# Patient Record
Sex: FEMALE | Race: WHITE | Employment: FULL TIME | URBAN - METROPOLITAN AREA
[De-identification: names, ages, dates, MRNs, and addresses within clinical notes are randomized per-mention and may not be internally consistent; named-entity substitution may affect disease eponyms.]

---

## 2017-01-09 PROBLEM — K46.9 HERNIA: Status: ACTIVE | Noted: 2017-01-09

## 2017-01-09 PROBLEM — G89.29 CHRONIC PELVIC PAIN IN FEMALE: Status: ACTIVE | Noted: 2017-01-09

## 2017-01-09 PROBLEM — R10.2 CHRONIC PELVIC PAIN IN FEMALE: Status: ACTIVE | Noted: 2017-01-09

## 2017-01-19 PROBLEM — K41.90 FEMORAL HERNIA OF RIGHT SIDE: Status: ACTIVE | Noted: 2017-01-19

## 2017-02-10 PROBLEM — Z09 S/P RIGHT INGUINAL HERNIA REPAIR, FOLLOW-UP EXAM: Status: ACTIVE | Noted: 2017-02-10

## 2017-02-21 DIAGNOSIS — J45.20 MILD INTERMITTENT ASTHMA WITHOUT COMPLICATION: Primary | ICD-10-CM

## 2017-02-21 RX ORDER — BUDESONIDE AND FORMOTEROL FUMARATE DIHYDRATE 160; 4.5 UG/1; UG/1
2 AEROSOL RESPIRATORY (INHALATION) 2 TIMES DAILY
Qty: 1 INHALER | Refills: 3 | Status: SHIPPED | OUTPATIENT
Start: 2017-02-21 | End: 2017-03-03 | Stop reason: HOSPADM

## 2018-09-26 PROBLEM — Z09 S/P RIGHT INGUINAL HERNIA REPAIR, FOLLOW-UP EXAM: Status: RESOLVED | Noted: 2017-02-10 | Resolved: 2018-09-26

## 2019-07-31 ENCOUNTER — HOSPITAL ENCOUNTER (OUTPATIENT)
Dept: GENERAL RADIOLOGY | Age: 39
Discharge: HOME | End: 2019-07-31
Payer: COMMERCIAL

## 2019-07-31 ENCOUNTER — HOSPITAL ENCOUNTER (OUTPATIENT)
Age: 39
Discharge: HOME | End: 2019-07-31
Payer: COMMERCIAL

## 2019-07-31 DIAGNOSIS — R59.1 LYMPHADENOPATHY OF HEAD AND NECK: ICD-10-CM

## 2019-07-31 PROCEDURE — 71046 X-RAY EXAM CHEST 2 VIEWS: CPT

## 2019-11-04 ENCOUNTER — HOSPITAL ENCOUNTER (OUTPATIENT)
Age: 39
Setting detail: SPECIMEN
Discharge: HOME | End: 2019-11-04
Payer: COMMERCIAL

## 2019-11-04 LAB
ALBUMIN SERPL-MCNC: 4.5 GM/DL (ref 3.4–5)
ALP BLD-CCNC: 54 IU/L (ref 40–129)
ALT SERPL-CCNC: 18 U/L (ref 10–40)
ANION GAP SERPL CALCULATED.3IONS-SCNC: 11 MMOL/L (ref 4–16)
AST SERPL-CCNC: 22 IU/L (ref 15–37)
BILIRUB SERPL-MCNC: 0.3 MG/DL (ref 0–1)
BUN BLDV-MCNC: 10 MG/DL (ref 6–23)
CALCIUM SERPL-MCNC: 9.4 MG/DL (ref 8.3–10.6)
CHLORIDE BLD-SCNC: 103 MMOL/L (ref 99–110)
CO2: 26 MMOL/L (ref 21–32)
CREAT SERPL-MCNC: 0.8 MG/DL (ref 0.6–1.1)
ERYTHROCYTE SEDIMENTATION RATE: 18 MM/HR (ref 0–20)
GFR AFRICAN AMERICAN: >60 ML/MIN/1.73M2
GFR NON-AFRICAN AMERICAN: >60 ML/MIN/1.73M2
GLUCOSE BLD-MCNC: 72 MG/DL (ref 70–99)
LACTATE DEHYDROGENASE: 214 IU/L (ref 120–246)
POTASSIUM SERPL-SCNC: 4.5 MMOL/L (ref 3.5–5.1)
SODIUM BLD-SCNC: 140 MMOL/L (ref 135–145)
TOTAL PROTEIN: 7 GM/DL (ref 6.4–8.2)

## 2019-11-04 PROCEDURE — 80053 COMPREHEN METABOLIC PANEL: CPT

## 2019-11-04 PROCEDURE — 85652 RBC SED RATE AUTOMATED: CPT

## 2019-11-04 PROCEDURE — 83615 LACTATE (LD) (LDH) ENZYME: CPT

## 2019-11-15 ENCOUNTER — HOSPITAL ENCOUNTER (OUTPATIENT)
Dept: CT IMAGING | Age: 39
Discharge: HOME | End: 2019-11-15
Payer: COMMERCIAL

## 2019-11-15 DIAGNOSIS — R59.0 CERVICAL LYMPHADENOPATHY: ICD-10-CM

## 2019-11-15 PROCEDURE — 71260 CT THORAX DX C+: CPT

## 2019-11-15 PROCEDURE — 70491 CT SOFT TISSUE NECK W/DYE: CPT

## 2019-11-15 PROCEDURE — 6360000004 HC RX CONTRAST MEDICATION: Performed by: INTERNAL MEDICINE

## 2019-11-15 PROCEDURE — 2580000003 HC RX 258: Performed by: INTERNAL MEDICINE

## 2019-11-15 RX ORDER — SODIUM CHLORIDE 0.9 % (FLUSH) 0.9 %
10 SYRINGE (ML) INJECTION ONCE
Status: COMPLETED | OUTPATIENT
Start: 2019-11-15 | End: 2019-11-15

## 2019-11-15 RX ADMIN — Medication 10 ML: at 08:54

## 2019-11-15 RX ADMIN — IOPAMIDOL 75 ML: 755 INJECTION, SOLUTION INTRAVENOUS at 08:54

## 2019-12-18 ENCOUNTER — HOSPITAL ENCOUNTER (OUTPATIENT)
Dept: OCCUPATIONAL THERAPY | Age: 39
Setting detail: THERAPIES SERIES
Discharge: HOME | End: 2019-12-18
Payer: COMMERCIAL

## 2019-12-18 ENCOUNTER — HOSPITAL ENCOUNTER (OUTPATIENT)
Dept: PHYSICAL THERAPY | Age: 39
Setting detail: THERAPIES SERIES
Discharge: HOME | End: 2019-12-18
Payer: COMMERCIAL

## 2019-12-18 PROCEDURE — 97035 APP MDLTY 1+ULTRASOUND EA 15: CPT

## 2019-12-18 PROCEDURE — G0283 ELEC STIM OTHER THAN WOUND: HCPCS

## 2019-12-18 PROCEDURE — 95832 HC OT HAND EVALUATION: CPT

## 2019-12-18 PROCEDURE — 97110 THERAPEUTIC EXERCISES: CPT

## 2019-12-18 PROCEDURE — 97162 PT EVAL MOD COMPLEX 30 MIN: CPT

## 2019-12-18 PROCEDURE — 97161 PT EVAL LOW COMPLEX 20 MIN: CPT

## 2019-12-18 ASSESSMENT — PAIN DESCRIPTION - PROGRESSION: CLINICAL_PROGRESSION: NOT CHANGED

## 2019-12-18 ASSESSMENT — PAIN DESCRIPTION - FREQUENCY: FREQUENCY: CONTINUOUS

## 2019-12-18 ASSESSMENT — PAIN - FUNCTIONAL ASSESSMENT: PAIN_FUNCTIONAL_ASSESSMENT: ACTIVITIES ARE NOT PREVENTED

## 2019-12-18 ASSESSMENT — PAIN DESCRIPTION - ORIENTATION: ORIENTATION: RIGHT;LEFT

## 2019-12-19 ASSESSMENT — PAIN DESCRIPTION - PROGRESSION: CLINICAL_PROGRESSION: NOT CHANGED

## 2019-12-19 ASSESSMENT — PAIN DESCRIPTION - LOCATION: LOCATION: NECK

## 2019-12-19 ASSESSMENT — PAIN SCALES - GENERAL: PAINLEVEL_OUTOF10: 5

## 2019-12-19 ASSESSMENT — PAIN DESCRIPTION - FREQUENCY: FREQUENCY: CONTINUOUS

## 2019-12-19 ASSESSMENT — PAIN - FUNCTIONAL ASSESSMENT: PAIN_FUNCTIONAL_ASSESSMENT: ACTIVITIES ARE NOT PREVENTED

## 2019-12-19 ASSESSMENT — PAIN DESCRIPTION - ORIENTATION: ORIENTATION: RIGHT;LEFT

## 2019-12-26 ENCOUNTER — HOSPITAL ENCOUNTER (OUTPATIENT)
Dept: PHYSICAL THERAPY | Age: 39
Setting detail: THERAPIES SERIES
Discharge: HOME | End: 2019-12-26
Payer: COMMERCIAL

## 2019-12-26 ENCOUNTER — HOSPITAL ENCOUNTER (OUTPATIENT)
Dept: OCCUPATIONAL THERAPY | Age: 39
Setting detail: THERAPIES SERIES
Discharge: HOME | End: 2019-12-26
Payer: COMMERCIAL

## 2019-12-26 PROCEDURE — 97110 THERAPEUTIC EXERCISES: CPT

## 2019-12-26 PROCEDURE — 97035 APP MDLTY 1+ULTRASOUND EA 15: CPT

## 2019-12-26 PROCEDURE — 97140 MANUAL THERAPY 1/> REGIONS: CPT

## 2019-12-26 PROCEDURE — G0283 ELEC STIM OTHER THAN WOUND: HCPCS

## 2019-12-30 ENCOUNTER — HOSPITAL ENCOUNTER (OUTPATIENT)
Dept: OCCUPATIONAL THERAPY | Age: 39
Setting detail: THERAPIES SERIES
Discharge: HOME | End: 2019-12-30
Payer: COMMERCIAL

## 2019-12-30 ENCOUNTER — HOSPITAL ENCOUNTER (OUTPATIENT)
Dept: PHYSICAL THERAPY | Age: 39
Setting detail: THERAPIES SERIES
Discharge: HOME | End: 2019-12-30
Payer: COMMERCIAL

## 2019-12-30 PROCEDURE — G0283 ELEC STIM OTHER THAN WOUND: HCPCS

## 2019-12-30 PROCEDURE — 97110 THERAPEUTIC EXERCISES: CPT

## 2019-12-30 PROCEDURE — 97530 THERAPEUTIC ACTIVITIES: CPT

## 2019-12-30 PROCEDURE — 97035 APP MDLTY 1+ULTRASOUND EA 15: CPT

## 2020-01-03 ENCOUNTER — HOSPITAL ENCOUNTER (OUTPATIENT)
Dept: OCCUPATIONAL THERAPY | Age: 40
Setting detail: THERAPIES SERIES
Discharge: HOME | End: 2020-01-03
Payer: COMMERCIAL

## 2020-01-03 ENCOUNTER — HOSPITAL ENCOUNTER (OUTPATIENT)
Dept: PHYSICAL THERAPY | Age: 40
Setting detail: THERAPIES SERIES
Discharge: HOME | End: 2020-01-03
Payer: COMMERCIAL

## 2020-01-03 PROCEDURE — 97140 MANUAL THERAPY 1/> REGIONS: CPT

## 2020-01-03 PROCEDURE — 97035 APP MDLTY 1+ULTRASOUND EA 15: CPT

## 2020-01-03 PROCEDURE — 97110 THERAPEUTIC EXERCISES: CPT

## 2020-01-03 PROCEDURE — 97530 THERAPEUTIC ACTIVITIES: CPT

## 2020-01-03 PROCEDURE — G0283 ELEC STIM OTHER THAN WOUND: HCPCS

## 2020-01-03 NOTE — FLOWSHEET NOTE
[] Ultrasound  [x] Estim  [] Gait Training      [] Cervical Traction [] Lumbar Traction  [x] Neuromuscular Re-education    [] Cold/hotpack [] Iontophoresis   [x] Instruction in HEP      [] Vasopneumatic   [x] Dry Needling    [x] Manual Therapy               [] Aquatic Therapy              Electronically signed by:  Nick Medina 1/3/2020, 9:42 AM

## 2020-01-06 ENCOUNTER — HOSPITAL ENCOUNTER (OUTPATIENT)
Dept: PHYSICAL THERAPY | Age: 40
Setting detail: THERAPIES SERIES
Discharge: HOME | End: 2020-01-06
Payer: COMMERCIAL

## 2020-01-06 ENCOUNTER — HOSPITAL ENCOUNTER (OUTPATIENT)
Dept: OCCUPATIONAL THERAPY | Age: 40
Setting detail: THERAPIES SERIES
Discharge: HOME | End: 2020-01-06
Payer: COMMERCIAL

## 2020-01-06 PROCEDURE — G0283 ELEC STIM OTHER THAN WOUND: HCPCS

## 2020-01-06 PROCEDURE — 97110 THERAPEUTIC EXERCISES: CPT

## 2020-01-06 PROCEDURE — 97035 APP MDLTY 1+ULTRASOUND EA 15: CPT

## 2020-01-06 PROCEDURE — 97140 MANUAL THERAPY 1/> REGIONS: CPT

## 2020-01-06 NOTE — FLOWSHEET NOTE
Outpatient Physical Therapy  [place]           [x] Phone: [phone]   Fax: [phone]  [place]           [] Phone: [phone]   Fax: [phone]        Physical Therapy Daily Treatment Note  Date:  2020    Patient Name:  Blanca Aceves    :  1980  MRN: [id number]  Restrictions/Precautions:  Other position/activity restrictions: none  Diagnosis:   Diagnosis: neck pain/ HA  Date of Injury/Surgery:   Treatment Diagnosis: Treatment Diagnosis: neck pain    Insurance/Certification information: PT [facility]   Referring Physician:  Referring Practitioner: Alexander Marsh Doctor Visit:    Plan of care signed (Y/N):    Outcome Measure: NDI     Visit# / total visits:  5 /10  Pain level: 3/10   Goals:          Long term goals  Time Frame for Long term goals : 8 weeks   Long term goal 1: patients goal- less pain  Long term goal 2: compliant with HEP  Long term goal 3: 10/50 NDI    Summary of Evaluation:   ASSESSMENT  Patient primary complaints: neck pain/ headaches  History of condition:onset of neck pain 2019- no change with time-not sure what increases pain; takes OTC ibuprofen- chiropractic once monthly- massage 1-2 x per week  Current functional limitations: pain does not prevent any activities however pain impacts on all ADL/IADL  Patient demonstrates: limited neck ROM due to pain- difficulty with upper back/shoudler muscle contraction due topain  PLOF:no pain in neck prior to 6 months ago  Skilled PT interventions are intended to address pain and muscle contraction issues  which should  enable patient  to return to PLOF  Patient agrees with established plan of care and assisted in the development of their short term and long term goals  Barriers to learning:none- no mental/cognitive barriers observed  preferred learning style(s):   written- demonstration -practice  Preferred Language: English  Potential barriers to progress:chronic pain  The patient appears

## 2020-01-08 ENCOUNTER — HOSPITAL ENCOUNTER (OUTPATIENT)
Dept: PHYSICAL THERAPY | Age: 40
Setting detail: THERAPIES SERIES
Discharge: HOME | End: 2020-01-08
Payer: COMMERCIAL

## 2020-01-08 ENCOUNTER — HOSPITAL ENCOUNTER (OUTPATIENT)
Dept: OCCUPATIONAL THERAPY | Age: 40
Setting detail: THERAPIES SERIES
Discharge: HOME | End: 2020-01-08
Payer: COMMERCIAL

## 2020-01-08 PROCEDURE — 97035 APP MDLTY 1+ULTRASOUND EA 15: CPT

## 2020-01-08 PROCEDURE — G0283 ELEC STIM OTHER THAN WOUND: HCPCS

## 2020-01-08 PROCEDURE — 97110 THERAPEUTIC EXERCISES: CPT

## 2020-01-08 PROCEDURE — 97140 MANUAL THERAPY 1/> REGIONS: CPT

## 2020-01-08 NOTE — FLOWSHEET NOTE
Occupational Therapy Out Patient Daily Treatment Note     [x][place] [address]      FAIZAN [facility]     [address] [facility] [address] [facility] 23       [facility] 218, [address], [address]       [address]ek[address] Arreg[address]     [phone]  [phone] [phone]  ______________________________________________________________________  Date:  2020  Patient Name:   Krystian    :  1980  Restrictions/Precautions:  General  Diagnosis:    : L wrist painTreatment Diagnosis:  wrist pain  Insurance/Certification information:  [facility]  Referring Physician:   Sonya Dumont  Plan of care signed (Y/N):    Visit# / total visits:   Pain level: 10/10 Now    10/10 at worst at work    Subjective: States\"I'm having a bad day, my wrist and neck are both hurting\" Pt was tearful during treatement session. Prior Level of Function:  Full functional use until past few months  Patient Goals: Decrease pain with use    Treatment Flowsheet   Right Left     US wrist ulnar  x     massage  x     stretches  x   AROM  x   PROM  x     Issued and instructed in HEP  x     digiflex 1.5#     Pinch pin 1#     Wrist flex and ext 1#       HP       Ice pack  x                                                             Interventions/Modalities used:  [x] Therapeutic Exercise   [x] Modalities:  [x] Therapeutic Activity    [x] Ultrasound [] Elec Stimulation   [] Total Motion Release    [] Fluido [] Kinesiotaping  [] Neuromuscular Re-education   [] Ionto [] Coldpack/hotpack   [x] Instruction in HEP    Other:    Objective Findings:  AROM WNL  .     Communication with other providers: POC to physician    Education provided to patient: Issued and instructed in HEP    Adverse Reactions to treatment: none    Time in:  145  Time out:  225  Timed treatment minutes:  35  Total treatment time:  40    Treatment/Activity Tolerance:     [x]  Patient tolerated treatment well []

## 2020-01-08 NOTE — FLOWSHEET NOTE
Outpatient Physical Therapy  [place]           [x] Phone: [phone]   Fax: [phone]  [place]           [] Phone: [phone]   Fax: [phone]        Physical Therapy Daily Treatment Note  Date:  2020    Patient Name:  Blanca Estrada    :  1980  MRN: [id number]  Restrictions/Precautions:  Other position/activity restrictions: none  Diagnosis:   Diagnosis: neck pain/ HA  Date of Injury/Surgery:   Treatment Diagnosis: Treatment Diagnosis: neck pain    Insurance/Certification information: PT [facility]: [facility]   Referring Physician:  Referring Practitioner: Shade Marsh Doctor Visit:    Plan of care signed (Y/N):    Outcome Measure: NDI     Visit# / total visits:  6 /10  Pain level: 3/10   Goals:          Long term goals  Time Frame for Long term goals : 8 weeks   Long term goal 1: patients goal- less pain  Long term goal 2: compliant with HEP  Long term goal 3: 10/50 NDI    Summary of Evaluation:   ASSESSMENT  Patient primary complaints: neck pain/ headaches  History of condition:onset of neck pain 2019- no change with time-not sure what increases pain; takes OTC ibuprofen- chiropractic once monthly- massage 1-2 x per week  Current functional limitations: pain does not prevent any activities however pain impacts on all ADL/IADL  Patient demonstrates: limited neck ROM due to pain- difficulty with upper back/shoudler muscle contraction due topain  PLOF:no pain in neck prior to 6 months ago  Skilled PT interventions are intended to address pain and muscle contraction issues  which should  enable patient  to return to PLOF  Patient agrees with established plan of care and assisted in the development of their short term and long term goals  Barriers to learning:none- no mental/cognitive barriers observed  preferred learning style(s):   written- demonstration -practice  Preferred Language: English  Potential barriers to progress:chronic pain  The patient appears motivated to participate in PT and regain PLOF: yes      Subjective: pt reports difficulty sleeping persists, could see no difference with E-stim so far,  Pt reports after 8 hour shift pt has severe pain in neck. Rad symptoms persist        Any changes in Ambulatory Summary Sheet? None        Objective:  See eval           Exercises: (No more than 4 columns)   Exercise/Equipment Date 12/18/19 Date 12/26/19 #2 Date 12/30/19 #3 Date 1/3/19 #4 Date 1/6/20 #5 Date 1/8/20 #6              WARM UP            UBE 2 ; FWD/ 1 min BWD 2 ; FWD/ 1 min BWD 3 min fwd/3 min bwd 3 min fwd/3 min bwd 3 min fwd/3 min bwd 4 min fwd/4 min bwd   pulleys  10 reps x10 reps x10 reps x15 reps x15 reps x15 reps   TABLE         Standing scap retraction YTB x10 reps YTB x10 reps YTB x15 reps No time YTB x15 reps YTB x15 reps   scap retraction                                        STANDING                                                                             PROPRIOCEPTION                                          Manual traction 5 min            MODALITIES See below IFC Leo UT 15 min IFC Leo UT 15 min IFC Leo UT 15 min with moist heat IFC Leo UT 15 min 1-150hz (sub acute) with moist heat IFC Leo UT 15 min 1-150hz (sub acute) with moist heat                         Other Therapeutic Activities/Education:        Home Exercise Program:        Manual Treatments:        Modalities:  Patient received 15 minutes of inferential current electrical stimulation to the  R/L upper trap area to decrease pain and muscle tension. Patient received this treatment in the seated position. The intensity of the current was raises to the patients comfort for pain relief. Patient demonstrated negative skin reaction after treatment.        Communication with other providers:    Assessment:  3-4/10 after treatment, pt reported icreased soreness after E-stim    Plan for Next Session:  above interventions      Time In / Time Out: 100-115       Timed Code/Total Treatment Minutes: 67/28 TE15, ES15, man15  Next Progress Note due:  10 sessions      Plan of Care Interventions:  [x] Therapeutic Exercise  [] Modalities:  [x] Therapeutic Activity     [] Ultrasound  [x] Estim  [] Gait Training      [] Cervical Traction [] Lumbar Traction  [x] Neuromuscular Re-education    [] Cold/hotpack [] Iontophoresis   [x] Instruction in HEP      [] Vasopneumatic   [x] Dry Needling    [x] Manual Therapy               [] Aquatic Therapy              Electronically signed by:  Riley Velásquez 1/8/2020, 8:14 AM

## 2020-01-13 ENCOUNTER — HOSPITAL ENCOUNTER (OUTPATIENT)
Dept: OCCUPATIONAL THERAPY | Age: 40
Setting detail: THERAPIES SERIES
Discharge: HOME | End: 2020-01-13
Payer: COMMERCIAL

## 2020-01-13 ENCOUNTER — HOSPITAL ENCOUNTER (OUTPATIENT)
Dept: PHYSICAL THERAPY | Age: 40
Setting detail: THERAPIES SERIES
Discharge: HOME | End: 2020-01-13
Payer: COMMERCIAL

## 2020-01-13 PROCEDURE — 97140 MANUAL THERAPY 1/> REGIONS: CPT

## 2020-01-13 PROCEDURE — G0283 ELEC STIM OTHER THAN WOUND: HCPCS

## 2020-01-13 PROCEDURE — 97110 THERAPEUTIC EXERCISES: CPT

## 2020-01-13 PROCEDURE — 97035 APP MDLTY 1+ULTRASOUND EA 15: CPT

## 2020-01-13 NOTE — FLOWSHEET NOTE
Outpatient Physical Therapy  [place]           [x] Phone: [phone]   Fax: [phone]  [place]           [] Phone: [phone]   Fax: [phone]        Physical Therapy Daily Treatment Note  Date:  2020    Patient Name:  Blanca Grant    :  1980  MRN: [id number]  Restrictions/Precautions:  Other position/activity restrictions: none  Diagnosis:   Diagnosis: neck pain/ HA  Date of Injury/Surgery:   Treatment Diagnosis: Treatment Diagnosis: neck pain    Insurance/Certification information: PT Insurance Information: [facility]   Referring Physician:  Referring Practitioner: Neli Marsh Doctor Visit:    Plan of care signed (Y/N):    Outcome Measure: NDI     Visit# / total visits:  7 /10  Pain level: 3/10   Goals:          Long term goals  Time Frame for Long term goals : 8 weeks   Long term goal 1: patients goal- less pain  Long term goal 2: compliant with HEP  Long term goal 3: 10/50 NDI    Summary of Evaluation:   ASSESSMENT  Patient primary complaints: neck pain/ headaches  History of condition:onset of neck pain 2019- no change with time-not sure what increases pain; takes OTC ibuprofen- chiropractic once monthly- massage 1-2 x per week  Current functional limitations: pain does not prevent any activities however pain impacts on all ADL/IADL  Patient demonstrates: limited neck ROM due to pain- difficulty with upper back/shoudler muscle contraction due topain  PLOF:no pain in neck prior to 6 months ago  Skilled PT interventions are intended to address pain and muscle contraction issues  which should  enable patient  to return to PLOF  Patient agrees with established plan of care and assisted in the development of their short term and long term goals  Barriers to learning:none- no mental/cognitive barriers observed  preferred learning style(s):   written- demonstration -practice  Preferred Language: English  Potential barriers to progress:chronic pain  The patient appears motivated to participate in PT and regain PLOF: yes      Subjective: pt reports slightly decreased neck pain today, \"I still feel pressure on L side\" lifting mostly with RUE at work and only light wts        Any changes in Ambulatory Summary Sheet? None        Objective:  See eval           Exercises: (No more than 4 columns)   Exercise/Equipment Date 1/3/19 #4 Date 1/6/20 #5 Date 1/8/20 #6 Date 1/13/20 #7            WARM UP          UBE 3 min fwd/3 min bwd 3 min fwd/3 min bwd 4 min fwd/4 min bwd 4 min fwd/4 min bwd   pulleys  x15 reps x15 reps x15 reps x20   TABLE       Standing scap retraction No time YTB x15 reps YTB x15 reps YTB x20   scap retraction                                STANDING                                                             PROPRIOCEPTION                               Manual traction 5 min Manual traction 5 min          MODALITIES IFC Leo UT 15 min with moist heat IFC Leo UT 15 min 1-150hz (sub acute) with moist heat IFC Leo UT 15 min 1-150hz (sub acute) with moist heat IFC Leo UT 15 min 1-150hz in supine with moist heat                     Other Therapeutic Activities/Education:        Home Exercise Program:        Manual Treatments:        Modalities:  Patient received 15 minutes of inferential current electrical stimulation to the  R/L upper trap area to decrease pain and muscle tension. Patient received this treatment in the seated position. The intensity of the current was raises to the patients comfort for pain relief. Patient demonstrated negative skin reaction after treatment.        Communication with other providers:    Assessment:  Pt reports no change in pain level after E-stim laying down, thus far no interventions have significantly impacted pt's pain level    Plan for Next Session:  above interventions      Time In / Time Out: 145-230       Timed Code/Total Treatment Minutes: 30/45 TE15, ES15, man15  Next Progress Note due:  10 sessions      Plan of Care Interventions:  [x] Therapeutic Exercise  [] Modalities:  [x] Therapeutic Activity     [] Ultrasound  [x] Estim  [] Gait Training      [] Cervical Traction [] Lumbar Traction  [x] Neuromuscular Re-education    [] Cold/hotpack [] Iontophoresis   [x] Instruction in HEP      [] Vasopneumatic   [x] Dry Needling    [x] Manual Therapy               [] Aquatic Therapy              Electronically signed by:  Riley Velásquez 1/13/2020, 10:00 AM

## 2020-01-15 ENCOUNTER — HOSPITAL ENCOUNTER (OUTPATIENT)
Dept: PHYSICAL THERAPY | Age: 40
Setting detail: THERAPIES SERIES
Discharge: HOME | End: 2020-01-15
Payer: COMMERCIAL

## 2020-01-15 ENCOUNTER — HOSPITAL ENCOUNTER (OUTPATIENT)
Dept: OCCUPATIONAL THERAPY | Age: 40
Setting detail: THERAPIES SERIES
Discharge: HOME | End: 2020-01-15
Payer: COMMERCIAL

## 2020-01-15 PROCEDURE — 97140 MANUAL THERAPY 1/> REGIONS: CPT

## 2020-01-15 PROCEDURE — 97110 THERAPEUTIC EXERCISES: CPT

## 2020-01-15 PROCEDURE — 97530 THERAPEUTIC ACTIVITIES: CPT

## 2020-01-15 PROCEDURE — 97035 APP MDLTY 1+ULTRASOUND EA 15: CPT

## 2020-01-15 PROCEDURE — 97112 NEUROMUSCULAR REEDUCATION: CPT

## 2020-01-15 PROCEDURE — G0283 ELEC STIM OTHER THAN WOUND: HCPCS

## 2020-01-15 NOTE — FLOWSHEET NOTE
Occupational Therapy Out Patient Daily Treatment Note     [x][place] [address]      FAIZAN [facility]     [address] [facility] [address] [facility] 23       [facility], [address], [address]       [address] [address]     [phone]  [phone] [phone]  ______________________________________________________________________  Date:  1/15/2020  Patient Name:   Krystian    :  1980  Restrictions/Precautions:  General  Diagnosis:    : L wrist painTreatment Diagnosis:  wrist pain  Insurance/Certification information:  [employer]  Referring Physician:   Cheryl Zaragoza  Plan of care signed (Y/N):    Visit# / total visits:   Pain level: 3/10 before and after tx    Subjective: States \"my wrist is feeling better\". Prior Level of Function:  Full functional use until past few months  Patient Goals: Decrease pain with use    Treatment Flowsheet   Right Left     US wrist ulnar  x     massage  x     stretches  x   AROM with fluidotherapy  x   PROM  x     Issued and instructed in HEP  reviewed     digiflex 1.5#  x   Pinch pin 1#  x   Wrist flex and ext 1#  x     HP       Ice pack  x     Sup\pron with bar within tolerance  x                                                      Interventions/Modalities used:  [x] Therapeutic Exercise   [x] Modalities:  [x] Therapeutic Activity    [x] Ultrasound [] Elec Stimulation   [] Total Motion Release    [] Fluido [] Kinesiotaping  [] Neuromuscular Re-education   [] Ionto [] Coldpack/hotpack   [x] Instruction in HEP    Other:    Objective Findings:  AROM WNL  .   11.9#    Communication with other providers: POC to physician    Education provided to patient: Issued and instructed in HEP    Adverse Reactions to treatment: none    Time in: 1300  Time out:  1345  Timed treatment minutes:  35  Total treatment time:  45    Treatment/Activity Tolerance:     [x]  Patient tolerated treatment well []

## 2020-01-21 NOTE — FLOWSHEET NOTE
cxd 20 for  and 20 because she can't get off work
motivated to participate in PT and regain PLOF: yes      Subjective: pt reports no change in pain level but says painful area is smaller now. Sleeping however; unimproved        Any changes in Ambulatory Summary Sheet? None        Objective:  See eval           Exercises: (No more than 4 columns)   Exercise/Equipment Date 1/3/19 #4 Date 1/6/20 #5 Date 1/8/20 #6 Date 1/13/20 #7 Date 1/15/20 #8             WARM UP           UBE 3 min fwd/3 min bwd 3 min fwd/3 min bwd 4 min fwd/4 min bwd 4 min fwd/4 min bwd 4 min fwd/4 min bwd   pulleys  x15 reps x15 reps x15 reps x20 x20   TABLE        Standing scap retraction No time YTB x15 reps YTB x15 reps YTB x20 x20 YTB   scap retraction low rows     10 reps                              STANDING                                                                     PROPRIOCEPTION                                   Manual traction 5 min Manual traction 5 min            MODALITIES IFC Leo UT 15 min with moist heat IFC Leo UT 15 min 1-150hz (sub acute) with moist heat IFC Leo UT 15 min 1-150hz (sub acute) with moist heat IFC Leo UT 15 min 1-150hz in supine with moist heat IFC Leo UT 15 min 1-150hz in supine with moist heat green 1/2 roll under knees                       Other Therapeutic Activities/Education:        Home Exercise Program:        Manual Treatments:        Modalities:  Patient received 15 minutes of inferential current electrical stimulation to the  R/L upper trap area to decrease pain and muscle tension. Patient received this treatment in the seated position. The intensity of the current was raises to the patients comfort for pain relief. Patient demonstrated negative skin reaction after treatment.        Communication with other providers:    Assessment:  3/10 after treatment    Plan for Next Session:  above interventions      Time In / Time Out: 145-225       Timed Code/Total Treatment Minutes: 25/40  TE10, NE15, ES15  Next Progress Note due:  10 sessions      Plan

## 2020-01-29 ENCOUNTER — HOSPITAL ENCOUNTER (OUTPATIENT)
Dept: PHYSICAL THERAPY | Age: 40
Setting detail: THERAPIES SERIES
Discharge: HOME | End: 2020-01-29
Payer: COMMERCIAL

## 2020-01-29 ENCOUNTER — HOSPITAL ENCOUNTER (OUTPATIENT)
Dept: OCCUPATIONAL THERAPY | Age: 40
Setting detail: THERAPIES SERIES
Discharge: HOME | End: 2020-01-29
Payer: COMMERCIAL

## 2020-01-29 PROCEDURE — 97140 MANUAL THERAPY 1/> REGIONS: CPT

## 2020-01-29 PROCEDURE — G0283 ELEC STIM OTHER THAN WOUND: HCPCS

## 2020-01-29 PROCEDURE — 97110 THERAPEUTIC EXERCISES: CPT

## 2020-01-29 PROCEDURE — 97035 APP MDLTY 1+ULTRASOUND EA 15: CPT

## 2020-01-29 NOTE — FLOWSHEET NOTE
Occupational Therapy Out Patient Daily Treatment Note     [x][place] [address]      FAIZAN [facility]     [address] [facility] [address] [place] [address]       [facility] [address], [address], 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 Prior Level of Function:  Full functional use until past few months  Patient Goals: Decrease pain with use    Treatment Flowsheet   Right Left     US wrist ulnar  x     massage  x     stretches  x   AROM with fluidotherapy  x   PROM  x     Issued and instructed in HEP  reviewed     digiflex 3#  x   Pinch pin 2#  x   Wrist flex and ext 1# and RD/UD  x            Ice pack       Sup\pron with bar within tolerance  x                                                      Interventions/Modalities used:  [x] Therapeutic Exercise   [x] Modalities:  [x] Therapeutic Activity    [x] Ultrasound [] Elec Stimulation   [] Total Motion Release    [] Fluido [] Kinesiotaping  [] Neuromuscular Re-education   [] Ionto [] Coldpack/hotpack   [x] Instruction in HEP    Other:    Objective Findings:  AROM WNL  .   13.6#    Communication with other providers: POC to physician    Education provided to patient: Issued and instructed in HEP    Adverse Reactions to treatment: none    Time in: 1300  Time out:  1345  Timed treatment minutes:  35  Total treatment time:  45    Treatment/Activity Tolerance:

## 2020-02-03 ENCOUNTER — HOSPITAL ENCOUNTER (OUTPATIENT)
Dept: PHYSICAL THERAPY | Age: 40
Setting detail: THERAPIES SERIES
Discharge: HOME | End: 2020-02-03
Payer: COMMERCIAL

## 2020-02-03 ENCOUNTER — HOSPITAL ENCOUNTER (OUTPATIENT)
Dept: OCCUPATIONAL THERAPY | Age: 40
Setting detail: THERAPIES SERIES
Discharge: HOME | End: 2020-02-03
Payer: COMMERCIAL

## 2020-02-03 PROCEDURE — 97022 WHIRLPOOL THERAPY: CPT

## 2020-02-03 PROCEDURE — 97110 THERAPEUTIC EXERCISES: CPT

## 2020-02-03 PROCEDURE — 97140 MANUAL THERAPY 1/> REGIONS: CPT

## 2020-02-03 PROCEDURE — G0283 ELEC STIM OTHER THAN WOUND: HCPCS

## 2020-02-03 NOTE — FLOWSHEET NOTE
Occupational Therapy Out Patient Daily Treatment Note     [x][place] [address]      FAIZAN [facility]     [address] [facility] [address] [place] [address]       [facility] [address], [address], [address]       Rek[address]     [phone]  [phone] [phone]  ______________________________________________________________________  Date:  2/3/2020  Patient Name:  April TA Boland    :  1980  Restrictions/Precautions:  General  Diagnosis:    : L wrist painTreatment Diagnosis:  wrist pain  Ins[facility]/Certification information:  [facility]  Referring Physician:   Ricardo Woo  Plan of care signed (Y/N):    Visit# / total visits:   Pain level: 3/10 before and after tx    Subjective: States \"I used it more at work and walking my dog and it's been hurting more\". Prior Level of Function:  Full functional use until past few months  Patient Goals: Decrease pain with use    Treatment Flowsheet   Right Left     US wrist ulnar  x     massage  x     stretches  x   AROM with fluidotherapy  x   PROM  x     Issued and instructed in HEP  reviewed     digiflex 3#  x   Pinch pin 2#  x   Wrist flex and ext 1# and RD/UD  x            Ice pack       Sup\pron with bar within tolerance  x                                                    Interventions/Modalities used:  [x] Therapeutic Exercise   [x] Modalities:  [x] Therapeutic Activity    [x] Ultrasound [] Elec Stimulation   [] Total Motion Release    [x] Fluido [] Kinesiotaping  [] Neuromuscular Re-education   [] Ionto [] Coldpack/hotpack   [x] Instruction in HEP    Other:    Objective Findings:  Increased discomfort at palm  .   13.6#    Communication with other providers: POC to physician    Education provided to patient: Issued and instructed in HEP    Adverse Reactions to treatment: none    Time in: 1300  Time out:  1345  Timed treatment minutes:  35  Total treatment time: 45    Treatment/Activity Tolerance:     [x]  Patient tolerated treatment well []  Patient limited by fatique    []  Patient limited by pain []  Patient limited by other medical complications   []  Other:       Goals:  Pt will decrease pain with movement to 1-2/10 to increase functional activity tolerance  Pt will increase L  5-10# to increase functional grasp   Pt will follow thru and be independent with HEP     LTG:   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 Patient Requires Follow-up:  [x]  Yes  []  No    Plan: [x]  Continue per plan of care []  Alter current plan (see comments)   []  Plan of care initiated []  Hold pending MD visit []  Discharge    Plan for Next Session:  Continue with POC.       Electronically signed by:  Edel White      , 2/3/2020, 1:21 PM

## 2020-02-03 NOTE — FLOWSHEET NOTE
Outpatient Physical Therapy  [place]           [x] Phone: [phone]   Fax: [phone]  Heather Tucker           [] Phone: [phone]   Fax: [phone]        Physical Therapy Daily Treatment Note  Date:  2/3/2020    Patient Name:  Blanca Gresham    :  1980  MRN: [id number]  Restrictions/Precautions:  Other position/activity restrictions: none  Diagnosis:   Diagnosis: neck pain/ HA  Date of Injury/Surgery:   Treatment Diagnosis: Treatment Diagnosis: neck pain    Insurance/Certification information: PT Insurance Information: [employer]   Referring Physician:  Referring Practitioner: David Cortez  Next Doctor Visit:  March  Plan of care signed (Y/N):    Outcome Measure: NDI     Visit# / total visits:  10 /16  Pain level: 3/10   Goals:          Long term goals  Time Frame for Long term goals : 8 weeks   Long term goal 1: patients goal- less pain  Long term goal 2: compliant with HEP  Long term goal 3: 10/50 NDI    Summary of Evaluation:   ASSESSMENT  Patient primary complaints: neck pain/ headaches  History of condition:onset of neck pain 2019- no change with time-not sure what increases pain; takes OTC ibuprofen- chiropractic once monthly- massage 1-2 x per week  Current functional limitations: pain does not prevent any activities however pain impacts on all ADL/IADL  Patient demonstrates: limited neck ROM due to pain- difficulty with upper back/shoudler muscle contraction due topain  PLOF:no pain in neck prior to 6 months ago  Skilled PT interventions are intended to address pain and muscle contraction issues  which should  enable patient  to return to PLOF  Patient agrees with established plan of care and assisted in the development of their short term and long term goals  Barriers to learning:none- no mental/cognitive barriers observed  preferred learning style(s):   written- demonstration -practice  Preferred Language: English  Potential barriers to progress:chronic pain  The patient appears motivated to participate in PT and regain PLOF: yes      Subjective:   Rad symptoms unchanged, feeling numbness and tingling in all fingers of R hand. Tightness in Upper trap continues with headaches. Pt denies any lasting benefit from either E-stim or STM, may try US next treatment    Any changes in Ambulatory Summary Sheet? None        Objective:  See eval           Exercises: (No more than 4 columns)   Exercise/Equipment Date 1/3/19 #4 Date 1/6/20 #5 Date 1/8/20 #6 Date 1/13/20 #7 Date 1/15/20 #8 Date 1/29/20 #9 Date 2/3/20 #10           NDI filled out,  form In chart:    WARM UP             UBE 3 min fwd/3 min bwd 3 min fwd/3 min bwd 4 min fwd/4 min bwd 4 min fwd/4 min bwd 4 min fwd/4 min bwd 4 min fwd/4 min bwd 4 min fwd/4 min bwd   pulleys  x15 reps x15 reps x15 reps x20 x20 x20 x20   TABLE          Standing scap retraction No time YTB x15 reps YTB x15 reps YTB x20 x20 YTB x20 YTB x20 YTB   scap retraction low rows     10 reps x10 YTB x10 YTB                                    STANDING                                                                                     PROPRIOCEPTION                                           Manual traction 5 min Manual traction 5 min   STM LEO upper traps 3 min (painful)              MODALITIES IFC Leo UT 15 min with moist heat IFC Leo UT 15 min 1-150hz (sub acute) with moist heat IFC Leo UT 15 min 1-150hz (sub acute) with moist heat IFC Leo UT 15 min 1-150hz in supine with moist heat IFC Leo UT 15 min 1-150hz in supine with moist heat green 1/2 roll under knees IFC Leo UT 15 min 1-150hz in supine with moist heat green 1/2 roll under knees IFC leo UT x15 min 1-150 hz supine with moist heat.  No sweep or vector scan                           Other Therapeutic Activities/Education:        Home Exercise Program:        Manual Treatments:        Modalities:  Patient received 15 minutes of inferential current electrical stimulation to the  R/L upper trap area to decrease pain and muscle tension. Patient received this treatment in the seated position. The intensity of the current was raises to the patients comfort for pain relief. Patient demonstrated negative skin reaction after treatment.        Communication with other providers:    Assessment:  3/10 after treatment,  Pt denies any lasting benefit from either E-stim or STM    Plan for Next Session:  above interventions      Time In / Time Out: 145-235      Timed Code/Total Treatment Minutes: 35/50  TE35, ES15  Next Progress Note due:  10 sessions      Plan of Care Interventions:  [x] Therapeutic Exercise  [] Modalities:  [x] Therapeutic Activity     [] Ultrasound  [x] Estim  [] Gait Training      [] Cervical Traction [] Lumbar Traction  [x] Neuromuscular Re-education    [] Cold/hotpack [] Iontophoresis   [x] Instruction in HEP      [] Vasopneumatic   [x] Dry Needling    [x] Manual Therapy               [] Aquatic Therapy              Electronically signed by:  Abram Baig, 10:25 AM

## 2020-02-04 NOTE — PROGRESS NOTES
concerns, please don't hesitate to call.   Thank you for your referral.    Physician Signature:______________________ Date:______ Time: ________  By signing above, therapists plan is approved by physician

## 2020-02-05 ENCOUNTER — HOSPITAL ENCOUNTER (OUTPATIENT)
Dept: OCCUPATIONAL THERAPY | Age: 40
Setting detail: THERAPIES SERIES
Discharge: HOME | End: 2020-02-05
Payer: COMMERCIAL

## 2020-02-05 ENCOUNTER — HOSPITAL ENCOUNTER (OUTPATIENT)
Dept: PHYSICAL THERAPY | Age: 40
Setting detail: THERAPIES SERIES
Discharge: HOME | End: 2020-02-05
Payer: COMMERCIAL

## 2020-02-05 PROCEDURE — 97110 THERAPEUTIC EXERCISES: CPT

## 2020-02-05 PROCEDURE — 97530 THERAPEUTIC ACTIVITIES: CPT

## 2020-02-05 PROCEDURE — 97035 APP MDLTY 1+ULTRASOUND EA 15: CPT

## 2020-02-05 PROCEDURE — 97140 MANUAL THERAPY 1/> REGIONS: CPT

## 2020-02-05 PROCEDURE — G0283 ELEC STIM OTHER THAN WOUND: HCPCS

## 2020-02-05 NOTE — FLOWSHEET NOTE
motivated to participate in PT and regain PLOF: yes      Subjective:   Rad symptoms persists with numbness in B hands,  Pt reports sleeping still problematic. After sitting for awhile in car,  approx 20-30 min, pt begins to have both neck and LBP. PT reports she gets relief from E-stim treatment but only last for a couple of days. Any changes in Ambulatory Summary Sheet? None        Objective:  See eval           Exercises: (No more than 4 columns)   Exercise/Equipment Date 1/3/19 #4 Date 1/6/20 #5 Date 1/8/20 #6 Date 1/13/20 #7 Date 1/15/20 #8 Date 1/29/20 #9 Date 2/3/20 #10 Date 2/5/20 #11           NDI filled out,  form In chart:     WARM UP              UBE 3 min fwd/3 min bwd 3 min fwd/3 min bwd 4 min fwd/4 min bwd 4 min fwd/4 min bwd 4 min fwd/4 min bwd 4 min fwd/4 min bwd 4 min fwd/4 min bwd 4 min fwd/4 min bwd   pulleys  x15 reps x15 reps x15 reps x20 x20 x20 x20 x20   TABLE           Standing scap retraction No time YTB x15 reps YTB x15 reps YTB x20 x20 YTB x20 YTB x20 YTB x20 YTB   scap retraction low rows     10 reps x10 YTB x10 YTB x10 YTB           RUE Prone shldr ext 10 reps           RUE Prone horiz abd with elbow flex 10 reps              RUE Prone horiz abduction x10 reps   STANDING        Pt unable to perform prone ex with LLE (cannot stand the position)                      horiz abd. Ext performed standing with flexed trunk.  10 reps                                                               PROPRIOCEPTION                                               Manual traction 5 min Manual traction 5 min   STM LEO upper traps 3 min (painful)                MODALITIES IFC Leo UT 15 min with moist heat IFC Leo UT 15 min 1-150hz (sub acute) with moist heat IFC Leo UT 15 min 1-150hz (sub acute) with moist heat IFC Leo UT 15 min 1-150hz in supine with moist heat IFC Leo UT 15 min 1-150hz in supine with moist heat green 1/2 roll under knees IFC Leo UT 15 min 1-150hz in supine with moist heat green 1/2 roll under knees IFC judy UT x15 min 1-150 hz supine with moist heat. No sweep or vector scan IFC judy UT x15 min 1-150 hz supine with moist heat. No sweep or vector scan                             Other Therapeutic Activities/Education:        Home Exercise Program:        Manual Treatments:        Modalities:  Patient received 15 minutes of inferential current electrical stimulation to the  R/L upper trap area to decrease pain and muscle tension. Patient received this treatment in the seated position. The intensity of the current was raises to the patients comfort for pain relief. Patient demonstrated negative skin reaction after treatment.        Communication with other providers:    Assessment:  3/10 after treatment,  Pt denies any lasting benefit from either E-stim or STM    Plan for Next Session:   pt on hold    Time In / Time Out: 145-230      Timed Code/Total Treatment Minutes: 30/45  TE15, TA15, ES15  Next Progress Note due:  10 sessions      Plan of Care Interventions:  [x] Therapeutic Exercise  [] Modalities:  [x] Therapeutic Activity     [] Ultrasound  [x] Estim  [] Gait Training      [] Cervical Traction [] Lumbar Traction  [x] Neuromuscular Re-education    [] Cold/hotpack [] Iontophoresis   [x] Instruction in HEP      [] Vasopneumatic   [x] Dry Needling    [x] Manual Therapy               [] Aquatic Therapy              Electronically signed by:  Shaka Lala, 8:11 AM

## 2020-02-10 ENCOUNTER — HOSPITAL ENCOUNTER (OUTPATIENT)
Dept: OCCUPATIONAL THERAPY | Age: 40
Setting detail: THERAPIES SERIES
Discharge: HOME | End: 2020-02-10
Payer: COMMERCIAL

## 2020-02-10 PROCEDURE — 97035 APP MDLTY 1+ULTRASOUND EA 15: CPT

## 2020-02-10 PROCEDURE — 97110 THERAPEUTIC EXERCISES: CPT

## 2020-02-10 PROCEDURE — 97140 MANUAL THERAPY 1/> REGIONS: CPT

## 2020-02-10 NOTE — FLOWSHEET NOTE
Occupational Therapy Out Patient Daily Treatment Note     [x][place] [address]      [facility]     [address]       [facility] [address], [address]       [address]     [phone]  [phone] [phone]  ______________________________________________________________________  Date:  2/10/2020  Patient Name:  Blanca Boland    :  1980  Restrictions/Precautions:  General  Diagnosis:    : L wrist painTreatment Diagnosis:  wrist pain  Insurance/Certification information:  [facility]  Referring Physician:   Sonya Dumont  Plan of care signed (Y/N):    Visit# / total visits: 10/12  Pain level: 3/10 before and after tx    Subjective: States has some pain along dorsal FA  Prior Level of Function:  Full functional use until past few months  Patient Goals: Decrease pain with use    Treatment Flowsheet   Right Left     US wrist ulnar  x     massage  x     stretches  x   AROM with fluidotherapy  x   PROM  x     Issued and instructed in HEP  reviewed     digiflex 3#  x   Pinch pin 2#  x   Wrist flex and ext 1# and RD/UD  x     Wrist flex and ext 1#  x     Ice pack       Sup\pron with bar within tolerance  x                                                    Interventions/Modalities used:  [x] Therapeutic Exercise   [x] Modalities:  [x] Therapeutic Activity    [x] Ultrasound [] Elec Stimulation   [] Total Motion Release    [x] Fluido [] Kinesiotaping  [] Neuromuscular Re-education   [] Ionto [] Coldpack/hotpack   [x] Instruction in HEP    Other:    Objective Findings: ROM WNL. Ulnar side is feeling better but has knots along extensor tendons in FA.     Communication with other providers: POC to physician    Education provided to patient: Issued and instructed in HEP    Adverse Reactions to treatment: none    Time in: 1300  Time out:  1340  Timed treatment minutes:  30  Total treatment time: no

## 2020-02-12 ENCOUNTER — HOSPITAL ENCOUNTER (OUTPATIENT)
Dept: OCCUPATIONAL THERAPY | Age: 40
Setting detail: THERAPIES SERIES
Discharge: HOME | End: 2020-02-12
Payer: COMMERCIAL

## 2020-02-12 PROCEDURE — 97035 APP MDLTY 1+ULTRASOUND EA 15: CPT

## 2020-02-12 PROCEDURE — 97140 MANUAL THERAPY 1/> REGIONS: CPT

## 2020-02-12 PROCEDURE — 97110 THERAPEUTIC EXERCISES: CPT

## 2020-02-18 ENCOUNTER — HOSPITAL ENCOUNTER (OUTPATIENT)
Dept: OCCUPATIONAL THERAPY | Age: 40
Setting detail: THERAPIES SERIES
Discharge: HOME | End: 2020-02-18
Payer: COMMERCIAL

## 2020-02-18 PROCEDURE — 97035 APP MDLTY 1+ULTRASOUND EA 15: CPT

## 2020-02-18 PROCEDURE — 97110 THERAPEUTIC EXERCISES: CPT

## 2020-02-18 PROCEDURE — 97140 MANUAL THERAPY 1/> REGIONS: CPT

## 2022-01-24 PROBLEM — G89.29 GROIN PAIN, CHRONIC, RIGHT: Status: ACTIVE | Noted: 2022-01-24

## 2022-01-24 PROBLEM — R10.31 GROIN PAIN, CHRONIC, RIGHT: Status: ACTIVE | Noted: 2022-01-24

## 2022-02-07 ENCOUNTER — APPOINTMENT (OUTPATIENT)
Dept: GENERAL RADIOLOGY | Age: 42
End: 2022-02-07
Payer: COMMERCIAL

## 2022-02-07 ENCOUNTER — HOSPITAL ENCOUNTER (EMERGENCY)
Age: 42
Discharge: HOME | End: 2022-02-07
Attending: EMERGENCY MEDICINE
Payer: COMMERCIAL

## 2022-02-07 VITALS
SYSTOLIC BLOOD PRESSURE: 125 MMHG | TEMPERATURE: 98.2 F | HEART RATE: 82 BPM | OXYGEN SATURATION: 99 % | RESPIRATION RATE: 16 BRPM | DIASTOLIC BLOOD PRESSURE: 83 MMHG

## 2022-02-07 DIAGNOSIS — R04.0 EPISTAXIS: Primary | ICD-10-CM

## 2022-02-07 DIAGNOSIS — K59.00 CONSTIPATION, UNSPECIFIED CONSTIPATION TYPE: ICD-10-CM

## 2022-02-07 LAB
ALBUMIN SERPL-MCNC: 3.7 GM/DL (ref 3.4–5)
ALP BLD-CCNC: 63 IU/L (ref 40–129)
ALT SERPL-CCNC: 20 U/L (ref 10–40)
ANION GAP SERPL CALCULATED.3IONS-SCNC: 8 MMOL/L (ref 4–16)
APTT: 31.2 SECONDS (ref 25.1–37.1)
AST SERPL-CCNC: 22 IU/L (ref 15–37)
BACTERIA: ABNORMAL /HPF
BASOPHILS ABSOLUTE: 0.1 K/CU MM
BASOPHILS RELATIVE PERCENT: 0.8 % (ref 0–1)
BILIRUB SERPL-MCNC: 0.4 MG/DL (ref 0–1)
BILIRUBIN URINE: NEGATIVE MG/DL
BLOOD, URINE: ABNORMAL
BUN BLDV-MCNC: 12 MG/DL (ref 6–23)
CALCIUM SERPL-MCNC: 8.4 MG/DL (ref 8.3–10.6)
CHLORIDE BLD-SCNC: 103 MMOL/L (ref 99–110)
CLARITY: CLEAR
CO2: 24 MMOL/L (ref 21–32)
COLOR: YELLOW
CREAT SERPL-MCNC: 0.7 MG/DL (ref 0.6–1.1)
DIFFERENTIAL TYPE: ABNORMAL
EOSINOPHILS ABSOLUTE: 0.3 K/CU MM
EOSINOPHILS RELATIVE PERCENT: 3.4 % (ref 0–3)
GFR AFRICAN AMERICAN: >60 ML/MIN/1.73M2
GFR NON-AFRICAN AMERICAN: >60 ML/MIN/1.73M2
GLUCOSE BLD-MCNC: 79 MG/DL (ref 70–99)
GLUCOSE, URINE: NEGATIVE MG/DL
HCG QUALITATIVE: NEGATIVE
HCT VFR BLD CALC: 37.1 % (ref 37–47)
HEMOGLOBIN: 12 GM/DL (ref 12.5–16)
IMMATURE NEUTROPHIL %: 0.3 % (ref 0–0.43)
INR BLD: 0.98 INDEX
KETONES, URINE: 15 MG/DL
LEUKOCYTE ESTERASE, URINE: ABNORMAL
LIPASE: 33 IU/L (ref 13–60)
LYMPHOCYTES ABSOLUTE: 2.7 K/CU MM
LYMPHOCYTES RELATIVE PERCENT: 29.6 % (ref 24–44)
MCH RBC QN AUTO: 29.5 PG (ref 27–31)
MCHC RBC AUTO-ENTMCNC: 32.3 % (ref 32–36)
MCV RBC AUTO: 91.2 FL (ref 78–100)
MONOCYTES ABSOLUTE: 0.9 K/CU MM
MONOCYTES RELATIVE PERCENT: 10 % (ref 0–4)
MUCUS: ABNORMAL HPF
NITRITE URINE, QUANTITATIVE: NEGATIVE
NUCLEATED RBC %: 0 %
PDW BLD-RTO: 11.9 % (ref 11.7–14.9)
PH, URINE: 5.5 (ref 5–8)
PLATELET # BLD: 284 K/CU MM (ref 140–440)
PMV BLD AUTO: 9.4 FL (ref 7.5–11.1)
POTASSIUM SERPL-SCNC: 3.8 MMOL/L (ref 3.5–5.1)
PROTEIN UA: NEGATIVE MG/DL
PROTHROMBIN TIME: 12.6 SECONDS (ref 11.7–14.5)
RBC # BLD: 4.07 M/CU MM (ref 4.2–5.4)
RBC URINE: 2 /HPF (ref 0–6)
SARS-COV-2, NAAT: NOT DETECTED
SEGMENTED NEUTROPHILS ABSOLUTE COUNT: 5 K/CU MM
SEGMENTED NEUTROPHILS RELATIVE PERCENT: 55.9 % (ref 36–66)
SODIUM BLD-SCNC: 135 MMOL/L (ref 135–145)
SOURCE: NORMAL
SPECIFIC GRAVITY UA: >1.03 (ref 1–1.03)
SQUAMOUS EPITHELIAL: 2 /HPF
TOTAL IMMATURE NEUTOROPHIL: 0.03 K/CU MM
TOTAL NUCLEATED RBC: 0 K/CU MM
TOTAL PROTEIN: 6.7 GM/DL (ref 6.4–8.2)
TROPONIN T: <0.01 NG/ML
UROBILINOGEN, URINE: NORMAL MG/DL (ref 0.2–1)
WBC # BLD: 9 K/CU MM (ref 4–10.5)
WBC UA: 13 /HPF (ref 0–5)

## 2022-02-07 PROCEDURE — 84703 CHORIONIC GONADOTROPIN ASSAY: CPT

## 2022-02-07 PROCEDURE — 85730 THROMBOPLASTIN TIME PARTIAL: CPT

## 2022-02-07 PROCEDURE — 85610 PROTHROMBIN TIME: CPT

## 2022-02-07 PROCEDURE — 80053 COMPREHEN METABOLIC PANEL: CPT

## 2022-02-07 PROCEDURE — 71046 X-RAY EXAM CHEST 2 VIEWS: CPT

## 2022-02-07 PROCEDURE — 85025 COMPLETE CBC W/AUTO DIFF WBC: CPT

## 2022-02-07 PROCEDURE — 6370000000 HC RX 637 (ALT 250 FOR IP): Performed by: EMERGENCY MEDICINE

## 2022-02-07 PROCEDURE — 87635 SARS-COV-2 COVID-19 AMP PRB: CPT

## 2022-02-07 PROCEDURE — 87086 URINE CULTURE/COLONY COUNT: CPT

## 2022-02-07 PROCEDURE — 83690 ASSAY OF LIPASE: CPT

## 2022-02-07 PROCEDURE — 81001 URINALYSIS AUTO W/SCOPE: CPT

## 2022-02-07 PROCEDURE — 84484 ASSAY OF TROPONIN QUANT: CPT

## 2022-02-07 PROCEDURE — 99283 EMERGENCY DEPT VISIT LOW MDM: CPT

## 2022-02-07 RX ORDER — POLYETHYLENE GLYCOL 3350 17 G/17G
17 POWDER, FOR SOLUTION ORAL DAILY
Qty: 510 G | Refills: 0 | Status: SHIPPED | OUTPATIENT
Start: 2022-02-07 | End: 2022-03-09

## 2022-02-07 RX ORDER — OXYMETAZOLINE HYDROCHLORIDE 0.05 G/100ML
2 SPRAY NASAL 2 TIMES DAILY PRN
Qty: 14.7 ML | Refills: 0 | Status: SHIPPED | OUTPATIENT
Start: 2022-02-07 | End: 2022-02-10

## 2022-02-07 RX ORDER — PROMETHAZINE HYDROCHLORIDE 25 MG/1
25 TABLET ORAL EVERY 6 HOURS PRN
Qty: 15 TABLET | Refills: 0 | Status: SHIPPED | OUTPATIENT
Start: 2022-02-07 | End: 2022-02-14

## 2022-02-07 RX ORDER — OMEPRAZOLE 40 MG/1
40 CAPSULE, DELAYED RELEASE ORAL
Qty: 30 CAPSULE | Refills: 0 | Status: SHIPPED | OUTPATIENT
Start: 2022-02-07

## 2022-02-07 RX ORDER — ONDANSETRON 4 MG/1
4 TABLET, ORALLY DISINTEGRATING ORAL ONCE
Status: COMPLETED | OUTPATIENT
Start: 2022-02-07 | End: 2022-02-07

## 2022-02-07 RX ORDER — ECHINACEA PURPUREA EXTRACT 125 MG
1 TABLET ORAL PRN
Qty: 1 EACH | Refills: 3 | Status: SHIPPED | OUTPATIENT
Start: 2022-02-07

## 2022-02-07 RX ORDER — DOCUSATE SODIUM 100 MG/1
100 CAPSULE, LIQUID FILLED ORAL 2 TIMES DAILY
Qty: 20 CAPSULE | Refills: 0 | Status: SHIPPED | OUTPATIENT
Start: 2022-02-07

## 2022-02-07 RX ORDER — ONDANSETRON 4 MG/1
4 TABLET, ORALLY DISINTEGRATING ORAL EVERY 8 HOURS PRN
Qty: 15 TABLET | Refills: 0 | Status: SHIPPED | OUTPATIENT
Start: 2022-02-07

## 2022-02-07 RX ADMIN — ONDANSETRON 4 MG: 4 TABLET, ORALLY DISINTEGRATING ORAL at 20:39

## 2022-02-07 ASSESSMENT — PAIN SCALES - GENERAL: PAINLEVEL_OUTOF10: 5

## 2022-02-07 ASSESSMENT — PAIN DESCRIPTION - ORIENTATION: ORIENTATION: LEFT

## 2022-02-07 ASSESSMENT — PAIN DESCRIPTION - PAIN TYPE: TYPE: ACUTE PAIN

## 2022-02-07 ASSESSMENT — PAIN DESCRIPTION - LOCATION: LOCATION: HEAD

## 2022-02-07 NOTE — Clinical Note
Blanca Boland was seen and treated in our emergency department on 2/7/2022. She may return to work on 02/09/2022. If you have any questions or concerns, please don't hesitate to call.       Marie Apodaca MD

## 2022-02-07 NOTE — ED PROVIDER NOTES
As PA-in-triage, I performed a medical screening history and physical exam on this patient. HISTORY OF PRESENT ILLNESS  April Srini Collins is a 39 y.o. female resents with concern for hemoptysis, epistaxis and dark stool. Patient was seen at outside ED facility yesterday for right groin pain and area of previous hernia repair with Dr Brandan Kolb. She had a negative work-up including CT imaging was discharged home. He did receive a dose of fentanyl was feeling constipated since pain control did have episode of dark stool today. While driving, states that she coughed and did have some bloody streaked sputum. Also began having left-sided epistaxis. No trauma or injury to the nose. No active epistaxis. No anticoagulation use. Is continued to have right lower quadrant pain without new vomiting or diarrhea. No hematemesis or coffee-ground emesis. [facility] PHYSICAL EXAM  /79   Pulse 82   Temp 98.5 °F (36.9 °C)   Resp 16   SpO2 98%     On exam, the patient appears well-hydrated, well-nourished, and in no acute distress. Mucous membranes are moist. Speech is clear. Breathing is unlabored. Skin is dry. Mental status is normal. The patient has normal gait, moves all extremities, and is without facial droop. Labs, imaging ordered at triage. Please see ED provider's note for patient complete ED evaluation, labs/imaging interpretation and final disposition/clinical impression.         Emir Aaron PA-C  02/07/22 0442

## 2022-02-08 NOTE — ED PROVIDER NOTES
CHIEF COMPLAINT  Chief Complaint   Patient presents with    Hemoptysis     Coughed up blood this AM, states that she was driving and coughed and spit up blood and now it has happened 3 times today    Epistaxis     not currently but had a bloody nose today    Other     Dark stool, hernia pain that started yesterday, and pain in lower right quad    Constipation     started today, had fentynal yesterday at another emergency room    Headache     hx of headaches and chills today        HPI  April M Tara Macdonald is a 39 y.o. female with history of chronic hernia pain for which she was yesterday at all who presents with several episode she coughed up a small amount of blood mixed sputum. Signs and symptoms earlier this morning and are mild, intermittent. She has had some chills and headache today as well as small, self-contained nosebleeds. Denies any trauma or blood thinners. Denies any chest pain or shortness of breath. Has acute on chronic right groin pain for which he had reassuring CT yesterday. Denies any vomiting but has also noted some dark stools and nausea. Also feeling slightly constipated on for the day.       REVIEW OF SYSTEMS  Review of Systems   History obtained from chart review and the patient  General ROS: negative for - fever  Ophthalmic ROS: negative for - decreased vision or double vision  ENT ROS: positive for - epistaxis  Hematological and Lymphatic ROS: negative for - bleeding problems  Endocrine ROS: negative for - unexpected weight changes  Respiratory ROS: no cough, shortness of breath, or wheezing  Cardiovascular ROS: no chest pain or dyspnea on exertion  Gastrointestinal ROS: positive for -constipation  Genito-Urinary ROS: no dysuria, trouble voiding, or hematuria  Musculoskeletal ROS: negative for - joint stiffness or joint swelling  Neurological ROS: no TIA or stroke symptoms      PAST MEDICAL HISTORY  Past Medical History:   Diagnosis Date    PCOS (polycystic ovarian syndrome)     Thyroid disease        FAMILY HISTORY  No family history on file. SOCIAL HISTORY  Social History     Socioeconomic History    Marital status: Single     Spouse name: Not on file    Number of children: Not on file    Years of education: Not on file    Highest education level: Not on file   Occupational History    Not on file   Tobacco Use    Smoking status: Former Smoker    Smokeless tobacco: Never Used   Vaping Use    Vaping Use: Never used   Substance and Sexual Activity    Alcohol use: No     Comment: occassionally    Drug use: No    Sexual activity: Not on file   Other Topics Concern    Not on file   Social History Narrative    Not on file     Social Determinants of Health     Financial Resource Strain:     Difficulty of Paying Living Expenses: Not on file   Food Insecurity:     Worried About [address] in the Last Year: Not on file    [address] [employer] in the Last Year: Not on file   Transportation Needs:     Lack of Transportation (Medical): Not on file    Lack of Transportation (Non-Medical):  Not on file   Physical Activity:     Days of Exercise per Week: Not on file    Minutes of Exercise per Session: Not on file   Stress:     Feeling of Stress : Not on file   Social Connections:     Frequency of Communication with Friends and Family: Not on file    Frequency of Social Gatherings with Friends and Family: Not on file    Attends [employer] Services: Not on file    Active Member of [address] [place] [address] or Organizations: Not on file    Attends Club or Organization Meetings: Not on file    Marital Status: Not on file   Intimate Partner Violence:     Fear of Current or Ex-Partner: Not on file    Emotionally Abused: Not on file    Physically Abused: Not on file    Sexually Abused: Not on file   Housing Stability:     Unable to Pay for Housing in the Last Year: Not on file    Number of [place] in the Last Year: Not on file    Unstable Housing in the Last Year: Not on file SURGICAL HISTORY  Past Surgical History:   Procedure Laterality Date    HERNIA REPAIR         CURRENT MEDICATIONS  No current facility-administered medications on file prior to encounter. Current Outpatient Medications on File Prior to Encounter   Medication Sig Dispense Refill    levothyroxine (SYNTHROID) 100 MCG tablet take 1 tablet by mouth once daily      Norgestim-Eth Estrad Triphasic 0.18/0.215/0.25 MG-25 MCG TABS   0         ALLERGIES  No Known Allergies    PHYSICAL EXAM  VITAL SIGNS: /83   Pulse 82   Temp 98.2 °F (36.8 °C)   Resp 16   SpO2 99%   Constitutional: Well developed, Well nourished, resting in bed, active  HENT: Normocephalic, Atraumatic, Bilateral external ears normal, Oropharynx moist, No oral exudates, Nose normal.   Eyes: PERRL, EOMI, Conjunctiva normal, No discharge. Neck: Normal range of motion, Supple, No stridor. Cardiovascular: Normal heart rate, Normal rhythm, No murmurs, No rubs, No gallops. Thorax & Lungs: Normal breath sounds, No respiratory distress, No wheezing, No chest tenderness. Abdomen: Bowel sounds normal, Soft, minimal right inguinal tenderness, no guarding, no rebound, No masses, No pulsatile masses. Skin: Warm, Dry, No erythema, No rash. Extremities: Intact distal pulses, No edema, No tenderness, No cyanosis, No clubbing. Musculoskeletal: Good gross range of motion in all major joints. No major deformities noted. Neurologic: Alert & oriented x 3, Normal gross motor function, Normal gross sensory function, No focal deficits noted. Psychiatric: Affect normal        RADIOLOGY/PROCEDURES/LABS  Last Imaging results   XR CHEST (2 VW)   Final Result   No acute process.              Imaging reviewed by myself    Labs Reviewed   CBC WITH AUTO DIFFERENTIAL - Abnormal; Notable for the following components:       Result Value    RBC 4.07 (*)     Hemoglobin 12.0 (*)     Monocytes % 10.0 (*)     Eosinophils % 3.4 (*)     All other components within normal limits   URINALYSIS - Abnormal; Notable for the following components:    Ketones, Urine 15 (*)     Blood, Urine LARGE (*)     Leukocyte Esterase, Urine SMALL (*)     WBC, UA 13 (*)     Bacteria, UA RARE (*)     Mucus, UA RARE (*)     All other components within normal limits   COVID-19, RAPID   CULTURE, URINE   COMPREHENSIVE METABOLIC PANEL   LIPASE   HCG, SERUM, QUALITATIVE   PROTIME/INR & PTT   TROPONIN         Medications   ondansetron (ZOFRAN-ODT) disintegrating tablet 4 mg (4 mg Oral Given 2/7/22 2039)       COURSE & MEDICAL DECISION MAKING  Pertinent Labs & Imaging studies reviewed. (See chart for details)    49-year-old female presents with epistaxis, intermittently spitting up/coughing up blood likely related as well as headache and chills. Her Covid testing is negative, chest x-ray is reassuring. She is saturating well on room air, PERC negative, low clinical suspicion for PE. She had a reassuring CT scan for the same abdominal pain yesterday, with low benefit to repeat today. She has reassuring blood counts, may having small upper GI bleed but will be referred to GI for further evaluation of dark stools. She is otherwise active, well-hydrated and we discharged follow with primary care for recheck. Strict precautions put into place. FINAL IMPRESSION  Problem List Items Addressed This Visit     None      Visit Diagnoses     Epistaxis    -  Primary    Constipation, unspecified constipation type          1.    2.   3.    Patient gave me permission to discuss medical history, care, and plan with those present in the room.   Electronically signed by: Swathi Weller MD, 2/8/2022  MD Swathi Pendleton MD  02/08/22 3595

## 2022-02-09 LAB
CULTURE: NORMAL
Lab: NORMAL
SPECIMEN: NORMAL

## 2022-02-11 PROBLEM — G57.81 ENTRAPMENT, ILIOINGUINAL NERVE, RIGHT: Status: ACTIVE | Noted: 2022-02-11

## 2022-02-11 PROBLEM — G58.8: Status: ACTIVE | Noted: 2022-02-11
